# Patient Record
Sex: FEMALE | Race: WHITE | NOT HISPANIC OR LATINO | Employment: UNEMPLOYED | URBAN - METROPOLITAN AREA
[De-identification: names, ages, dates, MRNs, and addresses within clinical notes are randomized per-mention and may not be internally consistent; named-entity substitution may affect disease eponyms.]

---

## 2017-01-19 ENCOUNTER — HOSPITAL ENCOUNTER (OUTPATIENT)
Dept: OTHER | Facility: HOSPITAL | Age: 42
Setting detail: SPECIMEN
Discharge: HOME | End: 2017-01-19
Attending: INTERNAL MEDICINE | Admitting: INTERNAL MEDICINE

## 2018-09-27 ENCOUNTER — HOSPITAL ENCOUNTER (OUTPATIENT)
Dept: ONCOLOGY | Facility: CLINIC | Age: 43
Setting detail: INFUSION SERIES
Discharge: HOME | End: 2018-09-27
Attending: INTERNAL MEDICINE | Admitting: INTERNAL MEDICINE

## 2018-09-27 ENCOUNTER — CLINICAL SUPPORT (OUTPATIENT)
Dept: ONCOLOGY | Facility: HOSPITAL | Age: 43
End: 2018-09-27

## 2018-10-19 ENCOUNTER — HOSPITAL ENCOUNTER (OUTPATIENT)
Dept: MRI IMAGING | Facility: HOSPITAL | Age: 43
Discharge: HOME | End: 2018-10-19
Attending: INTERNAL MEDICINE | Admitting: INTERNAL MEDICINE

## 2018-10-25 ENCOUNTER — CLINICAL SUPPORT (OUTPATIENT)
Dept: ONCOLOGY | Facility: HOSPITAL | Age: 43
End: 2018-10-25

## 2018-10-25 ENCOUNTER — HOSPITAL ENCOUNTER (OUTPATIENT)
Dept: ONCOLOGY | Facility: CLINIC | Age: 43
Setting detail: INFUSION SERIES
Discharge: HOME | End: 2018-10-25
Attending: INTERNAL MEDICINE | Admitting: INTERNAL MEDICINE

## 2018-11-06 ENCOUNTER — HOSPITAL ENCOUNTER (OUTPATIENT)
Dept: LAB | Facility: HOSPITAL | Age: 43
Discharge: HOME | End: 2018-11-06
Attending: INTERNAL MEDICINE | Admitting: INTERNAL MEDICINE

## 2018-11-06 LAB
ALBUMIN SERPL-MCNC: 4.4 G/DL (ref 3.5–4.8)
ALBUMIN/GLOB SERPL: 1.3 {RATIO} (ref 1–1.7)
ALP SERPL-CCNC: 59 IU/L (ref 32–91)
ALT SERPL-CCNC: 25 IU/L (ref 14–54)
ANION GAP SERPL CALC-SCNC: 13.6 MMOL/L (ref 10–20)
AST SERPL-CCNC: 27 IU/L (ref 15–41)
BASOPHILS # BLD AUTO: 0.1 10*3/UL (ref 0–0.2)
BASOPHILS NFR BLD AUTO: 1 % (ref 0–2)
BILIRUB SERPL-MCNC: 0.4 MG/DL (ref 0.3–1.2)
BILIRUB UR QL STRIP: NEGATIVE MG/DL
BUN SERPL-MCNC: 8 MG/DL (ref 8–20)
BUN/CREAT SERPL: 10 (ref 5.4–26.2)
CALCIUM SERPL-MCNC: 9.5 MG/DL (ref 8.9–10.3)
CASTS URNS QL MICRO: NORMAL /[LPF]
CHLORIDE SERPL-SCNC: 104 MMOL/L (ref 101–111)
COLOR UR: YELLOW
CONV BACTERIA IN URINE MICRO: NEGATIVE
CONV CLARITY OF URINE: CLEAR
CONV CO2: 25 MMOL/L (ref 22–32)
CONV HYALINE CASTS IN URINE MICRO: 1 /[LPF] (ref 0–5)
CONV PROTEIN IN URINE BY AUTOMATED TEST STRIP: NEGATIVE MG/DL
CONV SMALL ROUND CELLS: NORMAL /[HPF]
CONV TOTAL PROTEIN: 7.7 G/DL (ref 6.1–7.9)
CONV UROBILINOGEN IN URINE BY AUTOMATED TEST STRIP: 0.2 MG/DL
CREAT UR-MCNC: 0.8 MG/DL (ref 0.4–1)
CRP SERPL-MCNC: 0.38 MG/DL (ref 0–0.7)
CULTURE INDICATED?: NORMAL
DIFFERENTIAL METHOD BLD: (no result)
EOSINOPHIL # BLD AUTO: 0.2 10*3/UL (ref 0–0.3)
EOSINOPHIL # BLD AUTO: 2 % (ref 0–3)
ERYTHROCYTE [DISTWIDTH] IN BLOOD BY AUTOMATED COUNT: 14.4 % (ref 11.5–14.5)
ERYTHROCYTE [SEDIMENTATION RATE] IN BLOOD BY WESTERGREN METHOD: 5 MM/HR (ref 0–20)
GLOBULIN UR ELPH-MCNC: 3.3 G/DL (ref 2.5–3.8)
GLUCOSE SERPL-MCNC: 98 MG/DL (ref 65–99)
GLUCOSE UR QL: NEGATIVE MG/DL
HCT VFR BLD AUTO: 46 % (ref 35–49)
HGB BLD-MCNC: 15.4 G/DL (ref 12–15)
HGB UR QL STRIP: NEGATIVE
KETONES UR QL STRIP: NEGATIVE MG/DL
LEUKOCYTE ESTERASE UR QL STRIP: NEGATIVE
LYMPHOCYTES # BLD AUTO: 2.8 10*3/UL (ref 0.8–4.8)
LYMPHOCYTES NFR BLD AUTO: 29 % (ref 18–42)
MCH RBC QN AUTO: 29.1 PG (ref 26–32)
MCHC RBC AUTO-ENTMCNC: 33.4 G/DL (ref 32–36)
MCV RBC AUTO: 87.3 FL (ref 80–94)
MONOCYTES # BLD AUTO: 0.6 10*3/UL (ref 0.1–1.3)
MONOCYTES NFR BLD AUTO: 6 % (ref 2–11)
NEUTROPHILS # BLD AUTO: 6.1 10*3/UL (ref 2.3–8.6)
NEUTROPHILS NFR BLD AUTO: 62 % (ref 50–75)
NITRITE UR QL STRIP: NEGATIVE
NRBC BLD AUTO-RTO: 0 /100{WBCS}
NRBC/RBC NFR BLD MANUAL: 0 10*3/UL
PH UR STRIP.AUTO: 5 [PH] (ref 4.5–8)
PLATELET # BLD AUTO: 321 10*3/UL (ref 150–450)
PMV BLD AUTO: 8.2 FL (ref 7.4–10.4)
POTASSIUM SERPL-SCNC: 3.6 MMOL/L (ref 3.6–5.1)
RBC # BLD AUTO: 5.28 10*6/UL (ref 4–5.4)
RBC #/AREA URNS HPF: 1 /[HPF] (ref 0–3)
SODIUM SERPL-SCNC: 139 MMOL/L (ref 136–144)
SP GR UR: 1.02 (ref 1–1.03)
SPERM URNS QL MICRO: NORMAL /[HPF]
SQUAMOUS SPT QL MICRO: 0 /[HPF] (ref 0–5)
UNIDENT CRYS URNS QL MICRO: NORMAL /[HPF]
WBC # BLD AUTO: 9.9 10*3/UL (ref 4.5–11.5)
WBC #/AREA URNS HPF: 1 /[HPF] (ref 0–5)
YEAST SPEC QL WET PREP: NORMAL /[HPF]

## 2018-11-06 PROCEDURE — 86481 TB AG RESPONSE T-CELL SUSP: CPT

## 2018-11-07 ENCOUNTER — LAB REQUISITION (OUTPATIENT)
Dept: LAB | Facility: HOSPITAL | Age: 43
End: 2018-11-07

## 2018-11-07 DIAGNOSIS — Z00.00 ROUTINE GENERAL MEDICAL EXAMINATION AT A HEALTH CARE FACILITY: ICD-10-CM

## 2018-11-07 LAB
CONV HIV-1/ HIV-2: NORMAL
CONV HIV-1/ HIV-2: NORMAL
HAV IGM SERPL QL IA: NONREACTIVE
HBV CORE IGM SERPL QL IA: NONREACTIVE
HBV SURFACE AG SERPL QL IA: NONREACTIVE
HCV AB SER DONR QL: NORMAL
HCV AB SER DONR QL: NORMAL

## 2018-11-08 LAB
TSPOT INTERPRETATION: NEGATIVE
TSPOT INTERPRETATION: NORMAL
TSPOT NIL CONTROL INTERPRETATION: NORMAL
TSPOT PANEL A: 0
TSPOT PANEL B: 0
TSPOT POS CONTROL INTERPRETATION: NORMAL

## 2018-12-04 ENCOUNTER — HOSPITAL ENCOUNTER (OUTPATIENT)
Dept: ONCOLOGY | Facility: CLINIC | Age: 43
Setting detail: INFUSION SERIES
Discharge: HOME | End: 2018-12-04
Attending: INTERNAL MEDICINE | Admitting: INTERNAL MEDICINE

## 2018-12-04 ENCOUNTER — CLINICAL SUPPORT (OUTPATIENT)
Dept: ONCOLOGY | Facility: HOSPITAL | Age: 43
End: 2018-12-04

## 2018-12-04 NOTE — PROGRESS NOTES
PATIENTS ONCOLOGY RECORD LOCATED IN [facility]      Subjective     Name:  WILLIAM MARTINEZ     Date:  2018  Address:  [address] [place] [facility]  Home: [unfilled]  :  1975 AGE:  43 y.o.        RECORDS OBTAINED:  Patients Oncology Record is located in [facility]

## 2018-12-05 ENCOUNTER — HOSPITAL ENCOUNTER (OUTPATIENT)
Dept: RHEUMATOLOGY | Facility: CLINIC | Age: 43
Discharge: HOME | End: 2018-12-05
Attending: INTERNAL MEDICINE | Admitting: INTERNAL MEDICINE

## 2019-01-24 ENCOUNTER — HOSPITAL ENCOUNTER (OUTPATIENT)
Dept: LAB | Facility: HOSPITAL | Age: 44
Discharge: HOME | End: 2019-01-24
Attending: INTERNAL MEDICINE | Admitting: INTERNAL MEDICINE

## 2019-01-24 LAB
ALBUMIN SERPL-MCNC: 4.1 G/DL (ref 3.5–4.8)
ALBUMIN/GLOB SERPL: 1.2 {RATIO} (ref 1–1.7)
ALP SERPL-CCNC: 67 IU/L (ref 32–91)
ALT SERPL-CCNC: 16 IU/L (ref 14–54)
ANION GAP SERPL CALC-SCNC: 14.2 MMOL/L (ref 10–20)
AST SERPL-CCNC: 17 IU/L (ref 15–41)
BASOPHILS # BLD AUTO: 0 10*3/UL (ref 0–0.2)
BASOPHILS NFR BLD AUTO: 0 % (ref 0–2)
BILIRUB SERPL-MCNC: 0.5 MG/DL (ref 0.3–1.2)
BILIRUB UR QL STRIP: NEGATIVE MG/DL
BUN SERPL-MCNC: 6 MG/DL (ref 8–20)
BUN/CREAT SERPL: 6.7 (ref 5.4–26.2)
CALCIUM SERPL-MCNC: 9.6 MG/DL (ref 8.9–10.3)
CASTS URNS QL MICRO: NORMAL /[LPF]
CHLORIDE SERPL-SCNC: 100 MMOL/L (ref 101–111)
COLOR UR: YELLOW
CONV BACTERIA IN URINE MICRO: NEGATIVE
CONV CLARITY OF URINE: CLEAR
CONV CO2: 26 MMOL/L (ref 22–32)
CONV HYALINE CASTS IN URINE MICRO: 0 /[LPF] (ref 0–5)
CONV PROTEIN IN URINE BY AUTOMATED TEST STRIP: NEGATIVE MG/DL
CONV SMALL ROUND CELLS: NORMAL /[HPF]
CONV TOTAL PROTEIN: 7.4 G/DL (ref 6.1–7.9)
CONV UROBILINOGEN IN URINE BY AUTOMATED TEST STRIP: 0.2 MG/DL
CREAT UR-MCNC: 0.9 MG/DL (ref 0.4–1)
CRP SERPL-MCNC: 1.43 MG/DL (ref 0–0.7)
CULTURE INDICATED?: NORMAL
DIFFERENTIAL METHOD BLD: (no result)
EOSINOPHIL # BLD AUTO: 0.2 10*3/UL (ref 0–0.3)
EOSINOPHIL # BLD AUTO: 2 % (ref 0–3)
ERYTHROCYTE [DISTWIDTH] IN BLOOD BY AUTOMATED COUNT: 14.4 % (ref 11.5–14.5)
ERYTHROCYTE [SEDIMENTATION RATE] IN BLOOD BY WESTERGREN METHOD: 12 MM/HR (ref 0–20)
GLOBULIN UR ELPH-MCNC: 3.3 G/DL (ref 2.5–3.8)
GLUCOSE SERPL-MCNC: 97 MG/DL (ref 65–99)
GLUCOSE UR QL: NEGATIVE MG/DL
HCT VFR BLD AUTO: 45.8 % (ref 35–49)
HGB BLD-MCNC: 15.5 G/DL (ref 12–15)
HGB UR QL STRIP: NEGATIVE
KETONES UR QL STRIP: NEGATIVE MG/DL
LEUKOCYTE ESTERASE UR QL STRIP: NEGATIVE
LYMPHOCYTES # BLD AUTO: 2.4 10*3/UL (ref 0.8–4.8)
LYMPHOCYTES NFR BLD AUTO: 26 % (ref 18–42)
MCH RBC QN AUTO: 29.5 PG (ref 26–32)
MCHC RBC AUTO-ENTMCNC: 33.8 G/DL (ref 32–36)
MCV RBC AUTO: 87.2 FL (ref 80–94)
MONOCYTES # BLD AUTO: 0.6 10*3/UL (ref 0.1–1.3)
MONOCYTES NFR BLD AUTO: 7 % (ref 2–11)
NEUTROPHILS # BLD AUTO: 6 10*3/UL (ref 2.3–8.6)
NEUTROPHILS NFR BLD AUTO: 65 % (ref 50–75)
NITRITE UR QL STRIP: NEGATIVE
NRBC BLD AUTO-RTO: 0 /100{WBCS}
NRBC/RBC NFR BLD MANUAL: 0 10*3/UL
PH UR STRIP.AUTO: 6.5 [PH] (ref 4.5–8)
PLATELET # BLD AUTO: 346 10*3/UL (ref 150–450)
PMV BLD AUTO: 7.8 FL (ref 7.4–10.4)
POTASSIUM SERPL-SCNC: 4.2 MMOL/L (ref 3.6–5.1)
RBC # BLD AUTO: 5.25 10*6/UL (ref 4–5.4)
RBC #/AREA URNS HPF: 1 /[HPF] (ref 0–3)
SODIUM SERPL-SCNC: 136 MMOL/L (ref 136–144)
SP GR UR: 1.01 (ref 1–1.03)
SPERM URNS QL MICRO: NORMAL /[HPF]
SQUAMOUS SPT QL MICRO: 1 /[HPF] (ref 0–5)
UNIDENT CRYS URNS QL MICRO: NORMAL /[HPF]
WBC # BLD AUTO: 9.2 10*3/UL (ref 4.5–11.5)
WBC #/AREA URNS HPF: 0 /[HPF] (ref 0–5)
YEAST SPEC QL WET PREP: NORMAL /[HPF]

## 2019-05-29 ENCOUNTER — CONVERSION ENCOUNTER (OUTPATIENT)
Dept: RHEUMATOLOGY | Facility: CLINIC | Age: 44
End: 2019-05-29

## 2019-05-29 ENCOUNTER — HOSPITAL ENCOUNTER (OUTPATIENT)
Dept: LAB | Facility: HOSPITAL | Age: 44
Discharge: HOME | End: 2019-05-29
Attending: INTERNAL MEDICINE | Admitting: INTERNAL MEDICINE

## 2019-05-29 LAB
ALBUMIN SERPL-MCNC: 4.2 G/DL (ref 3.5–4.8)
ALBUMIN/GLOB SERPL: 1.4 {RATIO} (ref 1–1.7)
ALP SERPL-CCNC: 69 IU/L (ref 32–91)
ALT SERPL-CCNC: 18 IU/L (ref 14–54)
ANION GAP SERPL CALC-SCNC: 16.5 MMOL/L (ref 10–20)
AST SERPL-CCNC: 19 IU/L (ref 15–41)
BASOPHILS # BLD AUTO: 0 10*3/UL (ref 0–0.2)
BASOPHILS NFR BLD AUTO: 1 % (ref 0–2)
BILIRUB SERPL-MCNC: 0.6 MG/DL (ref 0.3–1.2)
BILIRUB UR QL STRIP: NEGATIVE MG/DL
BUN SERPL-MCNC: 6 MG/DL (ref 8–20)
BUN/CREAT SERPL: 7.5 (ref 5.4–26.2)
CALCIUM SERPL-MCNC: 9.6 MG/DL (ref 8.9–10.3)
CASTS URNS QL MICRO: NORMAL /[LPF]
CHLORIDE SERPL-SCNC: 105 MMOL/L (ref 101–111)
COLOR UR: YELLOW
CONV BACTERIA IN URINE MICRO: NEGATIVE
CONV CLARITY OF URINE: CLEAR
CONV CO2: 25 MMOL/L (ref 22–32)
CONV HYALINE CASTS IN URINE MICRO: 1 /[LPF] (ref 0–5)
CONV PROTEIN IN URINE BY AUTOMATED TEST STRIP: NEGATIVE MG/DL
CONV SMALL ROUND CELLS: NORMAL /[HPF]
CONV TOTAL PROTEIN: 7.3 G/DL (ref 6.1–7.9)
CONV UROBILINOGEN IN URINE BY AUTOMATED TEST STRIP: 0.2 MG/DL
CREAT UR-MCNC: 0.8 MG/DL (ref 0.4–1)
CRP SERPL-MCNC: 0.5 MG/DL (ref 0–0.7)
CULTURE INDICATED?: NORMAL
DIFFERENTIAL METHOD BLD: (no result)
EOSINOPHIL # BLD AUTO: 0.1 10*3/UL (ref 0–0.3)
EOSINOPHIL # BLD AUTO: 1 % (ref 0–3)
ERYTHROCYTE [DISTWIDTH] IN BLOOD BY AUTOMATED COUNT: 14.5 % (ref 11.5–14.5)
ERYTHROCYTE [SEDIMENTATION RATE] IN BLOOD BY WESTERGREN METHOD: 12 MM/HR (ref 0–20)
GLOBULIN UR ELPH-MCNC: 3.1 G/DL (ref 2.5–3.8)
GLUCOSE SERPL-MCNC: 96 MG/DL (ref 65–99)
GLUCOSE UR QL: NEGATIVE MG/DL
HCT VFR BLD AUTO: 45.2 % (ref 35–49)
HGB BLD-MCNC: 15 G/DL (ref 12–15)
HGB UR QL STRIP: NEGATIVE
KETONES UR QL STRIP: NEGATIVE MG/DL
LEUKOCYTE ESTERASE UR QL STRIP: NEGATIVE
LYMPHOCYTES # BLD AUTO: 2.4 10*3/UL (ref 0.8–4.8)
LYMPHOCYTES NFR BLD AUTO: 27 % (ref 18–42)
MCH RBC QN AUTO: 29.3 PG (ref 26–32)
MCHC RBC AUTO-ENTMCNC: 33.1 G/DL (ref 32–36)
MCV RBC AUTO: 88.4 FL (ref 80–94)
MONOCYTES # BLD AUTO: 0.3 10*3/UL (ref 0.1–1.3)
MONOCYTES NFR BLD AUTO: 3 % (ref 2–11)
NEUTROPHILS # BLD AUTO: 6.3 10*3/UL (ref 2.3–8.6)
NEUTROPHILS NFR BLD AUTO: 68 % (ref 50–75)
NITRITE UR QL STRIP: NEGATIVE
NRBC BLD AUTO-RTO: 0 /100{WBCS}
NRBC/RBC NFR BLD MANUAL: 0 10*3/UL
PH UR STRIP.AUTO: 6.5 [PH] (ref 4.5–8)
PLATELET # BLD AUTO: 320 10*3/UL (ref 150–450)
PMV BLD AUTO: 8.1 FL (ref 7.4–10.4)
POTASSIUM SERPL-SCNC: 4.5 MMOL/L (ref 3.6–5.1)
RBC # BLD AUTO: 5.11 10*6/UL (ref 4–5.4)
RBC #/AREA URNS HPF: 1 /[HPF] (ref 0–3)
SODIUM SERPL-SCNC: 142 MMOL/L (ref 136–144)
SP GR UR: 1.02 (ref 1–1.03)
SPERM URNS QL MICRO: NORMAL /[HPF]
SQUAMOUS SPT QL MICRO: 1 /[HPF] (ref 0–5)
UNIDENT CRYS URNS QL MICRO: NORMAL /[HPF]
WBC # BLD AUTO: 9.2 10*3/UL (ref 4.5–11.5)
WBC #/AREA URNS HPF: 0 /[HPF] (ref 0–5)
YEAST SPEC QL WET PREP: NORMAL /[HPF]

## 2019-06-04 VITALS
SYSTOLIC BLOOD PRESSURE: 116 MMHG | HEART RATE: 80 BPM | DIASTOLIC BLOOD PRESSURE: 78 MMHG | BODY MASS INDEX: 26.68 KG/M2 | WEIGHT: 166 LBS | HEIGHT: 66 IN

## 2019-06-06 NOTE — PROGRESS NOTES
Visit Type:  Follow-up Visit    CC:  joint pain.    History of Present Illness:    The patient is a 44-year-old female with seronegative inflammatory arthritis coming today in follow-up.  Her current treatment consists of MTX 15 mg once a week and folic acid.  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 and had good   recovery but it was slow, she reports a lot of swelling in her Rt.  Upper extremity and persistent pain.  She has resumed her treatment with MTX about 2 weeks ago.    Today she reports pain that is constant, achy, rated 6/10, stiffness that varies.  Her worse joints are her wrists, her fingers her ankles and her feet.  Her symptoms worsens with activity.  She has not identify anything that helps with her pain yet.    Review of systems is (-) for fever, she has dry eyes and dry mouth, she has occasional oral ulcers.  She has nausea with her MTX.  She has occasional CP, denies cough.  No dysuria, no macroscopic hematuria.    She continues to have migraine headaches  On   an off.All other systems reviewed and were (-).      Rheumatologic history:  1.Seronegative  inflammatory arthritis diagnosed 12/2018    Vectra DA 38     MTX (12/18) Start 12.5mg/wk for 2wks & then increase to 15mg/wk--SubQ due to GI distress    2. osteoporosis of the scaphoid lunate bilaterally  Status post surgery February 2019 by could send my nurse      Past Medical History:     Reviewed history from 11/06/2018 and no changes required:        Arthritis        Breast Lump        Hernia        Migraine Headaches    Past Surgical History:     Reviewed history from 11/06/2018 and no changes required:        Hysterectomy    Family History Summary:      Reviewed history Last on 02/05/2019 and no changes required:05/30/2019  MGM - Has Family History of Stroke/CVA - Entered On: 11/6/2018  MGM - Has Family History of Kidney/Renal Disease - Entered On: 11/6/2018  MGM - Has Family History of Diabetes - Entered On:  11/6/2018  MGF - Has Family History of Severe Allergies - Entered On: 11/6/2018  MGF - Has Family History of Hypertension - Entered On: 11/6/2018      Social History:     Reviewed history from 12/05/2018 and no changes required:        Patient currently smokes every day.        Patient has been counseled to quit.        Passive Smoke: Y        Alcohol Use: N        Drug Use: N        HIV/High Risk: N        Regular Exercise: N        Hx Domestic Abuse: N        Zoroastrianism Affecting Care: N                Alcohol Use: N        Risk Factors:     Smoked Tobacco Use:  Current every day smoker     Cigarettes:  Yes -- 1 pack(s) per day,Smokeless Tobacco Use:  Never     Counseled to quit/cut down:  yes  Passive smoke exposure:  yes  Drug use:  no  HIV high-risk behavior:  no  Alcohol use:  no  Exercise:  no  Seatbelt use:  100 %  Sun Exposure:  rarely    Previous Tobacco Use: Signed On - 02/05/2019  Smoked Tobacco Use:  Current every day smoker     Cigarettes:  Yes -- 1 pack(s) per day,Smokeless Tobacco Use:  Never     Counseled to quit/cut down:  yes  Passive smoke exposure:  yes  Drug use:  no  HIV high-risk behavior:  no    Previous Alcohol Use: Signed On - 02/05/2019  Alcohol use:  no  Exercise:  no  Seatbelt use:  100 %  Sun Exposure:  rarely    Active Medications (reviewed today):  PREVNAR 13 INTRAMUSCULAR SUSPENSION (PNEUMOCOCCAL 13-SERA CONJ VACC) Inject 1 sub q at pharmacy  MUCINEX DM  MG ORAL TABLET EXTENDED RELEASE 12 HOUR (DEXTROMETHORPHAN-GUAIFENESIN) Take 1 tab po qd with MTX and 1 tab po BID the day after MTX  FOLIC ACID 1 MG ORAL TABLET (FOLIC ACID) Take 1 tablet by mouth daily  METHOTREXATE 2.5 MG ORAL TABLET (METHOTREXATE SODIUM) Take 5 tabs po q weekly for 2 weeks then increase to 6 tabs po q weekly (divided dose 3 in the am and then 3 in the pm)  DICLOFENAC SODIUM 1 % TRANSDERMAL GEL (DICLOFENAC SODIUM) 4 times daily as needed  CYANOCOBALAMIN 1000 MCG/ML INJECTION SOLUTION (CYANOCOBALAMIN) 1ml inj  every 2 weeks  VITAMIN D3 88748 UNIT ORAL TABLET (CHOLECALCIFEROL) take one tablet by mouth once weekly    Current Allergies (reviewed today):  CIPRO (Mild)      Review of Systems        See HPI      Vital Signs:    Patient Profile:    44 Years Old Female  Height:     65.5 inches (166.37 cm)  Weight:     166 pounds  BMI:        27.20     BSA:        1.84  Pulse rate: 80 / minute  BP Sittin / 78  (right arm)    Cuff size:  regular      Problems: Active problems were reviewed with the patient during this visit.  Medications: Medications were reviewed with the patient during this visit.  Allergies: Allergies were reviewed with the patient during this visit.        Vitals Entered By: Anika Pinto MA (May 29, 2019 10:50 AM)      Physical Exam    General:      well developed, well nourished, in no acute distress.    Head:      normocephalic and atraumatic.    Eyes:      PERRL/EOM intact, conjunctiva and sclera clear with out nystagmus.    Lungs:      clear bilaterally to auscultation.    Heart:      non-displaced PMI, chest non-tender; regular rate and rhythm, S1, S2 without murmurs, rubs, or gallops  Msk:       tenderness in both wrists  Rt.>Lt.  There is tenderness in 6 MCP joints, 4 PIP joints.  There is mild synovitis noted in 1 PIP joint.  Pulses:      pulses normal in all 4 extremities.    Skin:      intact without lesions or rashes.      Diabetes Management Exam:      Foot Exam (with socks and/or shoes not present):        Pulses:           pulses normal in all 4 extremities.        Blood Pressure:  Today's BP: 116/78 mm Hg            Impression & Recommendations:    Problem # 1:  Arthritis, rheumatoid, seronegative (ICD-714.0) (KJC31-U78.00)  Assessment: Unchanged    44-year-old female with seronegative inflammatory arthritis currently on MTX 15 mg p.o..  She reports GI distress and headaches with the medication but mostly GI distress. Today she has 12 tender joints, synovitis in 1 joint. Pt. Ga 70.   Plan to  "stop   p.o. medication and transition her to subcutaneous form of MTX.  Will I will add on  on the sulfasalazine.          Cancer screening:  Colonoscopy:    YES  2017                 Mammogram:   8/2018               Pap Smear: Hysterectomy  Bone health: Calcium and Vitamin D:    YES           DXA scan: NO  Vaccines: Flu:     NO                PNA: 13 1/2019 23 2/2019                Zoster:  NO  X-rays: Chest:  12/5/18           Hands:  11/9/18 and [facility] U/S              Feet: 12/5/18  Hepatitis panel, HIV, QTB/PPD:   11/6/18 HEP, HIV and TB      Problem # 2:  Long-term use of high risk medications (ICD-V58.69) (SIU36-X50.899)  Assessment: Unchanged    Long-term high-risk medications.  On MTX and sulfasalazine for management of inflammatory arthritis, monitor for side effects. will transition to subcutaneous MTX due to to reported GI distress.    Problem # 3:  Osteonecrosis (ICD-733.40) (GRC33-U45.9)  Continues to complain of pain at her surgical intervention but overall doing better.  She is planning to proceed with her next surgery in a year or so.  Her updated medication list for this problem includes:     Vitamin D3 63943 Unit Oral Tablet (Cholecalciferol) ..... Take one tablet by mouth once weekly      Problem # 4:  Migraine (ICD-346.90) (ECF42-H51.909)  Assessment: Comment Only    I will monitor to see if there is any worsening migraines after she is transitioned to the subcutaneous form of MTX.  She refers that she has not required to take Mucinex recently.    Medications Added to Medication List This Visit:  1)  Sulfasalazine 500 Mg Oral Tablet (Sulfasalazine) .... Take one (1) tablet by mouth twice a day  2)  Bd Tb Syringe 25g X 5/8\" 1 Ml (Tuberculin-allergy syringes) .... Use as directed  3)  Folic Acid 1 Mg Oral Tablet (Folic acid) .... Take 1 tablet by mouth daily  4)  Methotrexate Sodium 250 Mg/10ml Injection Solution (Methotrexate sodium) .... Inject 15mg (0.6cc) sq once weekly    Other Orders:  [facility] CBC " W/DIFF; PATH REVIEW IF INDICATED (CBC)  [facility] COMPREHENSIVE METABOLIC PANEL (CMP) (MPC)  [facility] SEDIMENTATION RATE (ESR)  [facility] CRP C-REACTIVE PROTEIN INFLAMMATION (CRP)  [facility] URINALYSIS W/MICROSCOPIC (UAM)  VECTRA DA (CPT-06573)      Patient Instructions:  1)  Stop  P.o. MTX  2)  Start subcutaneous MTX 15 mg once a week  3)  Continue with folic acid 1 mg p.o. daily  4)  Start sulfasalazine 500 mg 1 tablet p.o. twice a day  5)  Compounded cream to be applied twice daily in affected joints  6)  RTC in 3 months or sooner if need                      Medication Administration    Orders Added:  1)  [facility] CBC W/DIFF; PATH REVIEW IF INDICATED [CBC]  2)  [facility] COMPREHENSIVE METABOLIC PANEL (CMP) [MPC]  3)  [facility] SEDIMENTATION RATE [ESR]  4)  [facility] CRP C-REACTIVE PROTEIN INFLAMMATION [CRP]  5)  [facility] URINALYSIS W/MICROSCOPIC [UAM]  6)  VECTRA DA [CPT-11215]  7)  Ofc Vst, Est Level IV [93422]  ]      Electronically signed by Aimee Ramirez MD on 05/30/2019 at 5:01 PM  ________________________________________________________________________       Disclaimer: Converted Note message may not contain all data elements that existed in the legacy source system. Please see [employer] System for the original note details.

## 2019-06-20 ENCOUNTER — TELEPHONE (OUTPATIENT)
Dept: RHEUMATOLOGY | Facility: CLINIC | Age: 44
End: 2019-06-20

## 2019-06-20 NOTE — TELEPHONE ENCOUNTER
Phone Note   Call from Patient  Summary of Call: Patient calling stating that she is having CHEST tightness and she feels it's from the SSZ and it's making her nauseated.     Follow-up for Phone Call   Follow-up Details: I stated for her to D/C this medication for 1 week and to call the office back if her symptoms subside and then I will get with the doctor to see if it needs to be replaced with something else.  Follow-up by: Nancy Clemens CMA,  June 14, 2019 1:20 PM    Additional Follow-up for Phone Call   Additional Follow-up Details: Yes, I agree. Pt definitely needs to stop medication & let us know how she feels after stopping this.   Additional Follow-up by: Destiny CAR,  June 14, 2019 9:26 PM

## 2019-07-30 RX ORDER — METHOTREXATE 25 MG/ML
INJECTION, SOLUTION INTRA-ARTERIAL; INTRAMUSCULAR; INTRAVENOUS
Qty: 8 ML | Refills: 0 | Status: SHIPPED | OUTPATIENT
Start: 2019-07-30 | End: 2019-09-17

## 2019-09-17 ENCOUNTER — TRANSCRIBE ORDERS (OUTPATIENT)
Dept: LAB | Facility: HOSPITAL | Age: 44
End: 2019-09-17

## 2019-09-17 DIAGNOSIS — Z00.00 ROUTINE GENERAL MEDICAL EXAMINATION AT A HEALTH CARE FACILITY: Primary | ICD-10-CM

## 2019-09-17 PROCEDURE — 81003 URINALYSIS AUTO W/O SCOPE: CPT

## 2019-09-17 RX ORDER — METHOTREXATE 25 MG/ML
INJECTION INTRA-ARTERIAL; INTRAMUSCULAR; INTRATHECAL; INTRAVENOUS
Qty: 10 ML | Refills: 0 | Status: SHIPPED | OUTPATIENT
Start: 2019-09-17

## 2019-09-18 ENCOUNTER — LAB (OUTPATIENT)
Dept: LAB | Facility: HOSPITAL | Age: 44
End: 2019-09-18

## 2019-09-18 DIAGNOSIS — Z00.00 ROUTINE GENERAL MEDICAL EXAMINATION AT A HEALTH CARE FACILITY: ICD-10-CM

## 2019-09-18 LAB
ALBUMIN SERPL-MCNC: 4.1 G/DL (ref 3.5–4.8)
ALBUMIN/GLOB SERPL: 1.4 G/DL (ref 1–1.7)
ALP SERPL-CCNC: 68 U/L (ref 32–91)
ALT SERPL W P-5'-P-CCNC: 45 U/L (ref 14–54)
ANION GAP SERPL CALCULATED.3IONS-SCNC: 12.1 MMOL/L (ref 5–15)
AST SERPL-CCNC: 29 U/L (ref 15–41)
BASOPHILS # BLD AUTO: 0 10*3/MM3 (ref 0–0.2)
BASOPHILS NFR BLD AUTO: 0.7 % (ref 0–1.5)
BILIRUB SERPL-MCNC: 0.4 MG/DL (ref 0.3–1.2)
BILIRUB UR QL STRIP: NEGATIVE
BUN BLD-MCNC: 11 MG/DL (ref 8–20)
BUN/CREAT SERPL: 12.2 (ref 5.4–26.2)
CALCIUM SPEC-SCNC: 9.2 MG/DL (ref 8.9–10.3)
CHLORIDE SERPL-SCNC: 106 MMOL/L (ref 101–111)
CLARITY UR: CLEAR
CO2 SERPL-SCNC: 26 MMOL/L (ref 22–32)
COLOR UR: YELLOW
CREAT BLD-MCNC: 0.9 MG/DL (ref 0.4–1)
CRP SERPL-MCNC: 0.33 MG/DL (ref 0–0.7)
DEPRECATED RDW RBC AUTO: 49.4 FL (ref 37–54)
EOSINOPHIL # BLD AUTO: 0.2 10*3/MM3 (ref 0–0.4)
EOSINOPHIL NFR BLD AUTO: 2.2 % (ref 0.3–6.2)
ERYTHROCYTE [DISTWIDTH] IN BLOOD BY AUTOMATED COUNT: 15.5 % (ref 12.3–15.4)
ERYTHROCYTE [SEDIMENTATION RATE] IN BLOOD: 9 MM/HR (ref 0–20)
GFR SERPL CREATININE-BSD FRML MDRD: 68 ML/MIN/1.73
GLOBULIN UR ELPH-MCNC: 3 GM/DL (ref 2.5–3.8)
GLUCOSE BLD-MCNC: 89 MG/DL (ref 65–99)
GLUCOSE UR STRIP-MCNC: NEGATIVE MG/DL
HCT VFR BLD AUTO: 43.1 % (ref 34–46.6)
HGB BLD-MCNC: 14.7 G/DL (ref 12–15.9)
HGB UR QL STRIP.AUTO: NEGATIVE
KETONES UR QL STRIP: NEGATIVE
LEUKOCYTE ESTERASE UR QL STRIP.AUTO: NEGATIVE
LYMPHOCYTES # BLD AUTO: 2.3 10*3/MM3 (ref 0.7–3.1)
LYMPHOCYTES NFR BLD AUTO: 30.4 % (ref 19.6–45.3)
MCH RBC QN AUTO: 31.7 PG (ref 26.6–33)
MCHC RBC AUTO-ENTMCNC: 34.1 G/DL (ref 31.5–35.7)
MCV RBC AUTO: 92.7 FL (ref 79–97)
MONOCYTES # BLD AUTO: 0.6 10*3/MM3 (ref 0.1–0.9)
MONOCYTES NFR BLD AUTO: 7.8 % (ref 5–12)
NEUTROPHILS # BLD AUTO: 4.4 10*3/MM3 (ref 1.7–7)
NEUTROPHILS NFR BLD AUTO: 58.9 % (ref 42.7–76)
NITRITE UR QL STRIP: NEGATIVE
NRBC BLD AUTO-RTO: 0 /100 WBC (ref 0–0.2)
PH UR STRIP.AUTO: 6.5 [PH] (ref 5–8)
PLATELET # BLD AUTO: 287 10*3/MM3 (ref 140–450)
PMV BLD AUTO: 7.8 FL (ref 6–12)
POTASSIUM BLD-SCNC: 4.1 MMOL/L (ref 3.6–5.1)
PROT SERPL-MCNC: 7.1 G/DL (ref 6.1–7.9)
PROT UR QL STRIP: NEGATIVE
RBC # BLD AUTO: 4.64 10*6/MM3 (ref 3.77–5.28)
SODIUM BLD-SCNC: 140 MMOL/L (ref 136–144)
SP GR UR STRIP: 1.02 (ref 1–1.03)
UROBILINOGEN UR QL STRIP: NORMAL
WBC NRBC COR # BLD: 7.5 10*3/MM3 (ref 3.4–10.8)

## 2019-09-18 PROCEDURE — 80053 COMPREHEN METABOLIC PANEL: CPT

## 2019-09-18 PROCEDURE — 36415 COLL VENOUS BLD VENIPUNCTURE: CPT

## 2019-09-18 PROCEDURE — 81003 URINALYSIS AUTO W/O SCOPE: CPT

## 2019-09-18 PROCEDURE — 86140 C-REACTIVE PROTEIN: CPT

## 2019-09-18 PROCEDURE — 85025 COMPLETE CBC W/AUTO DIFF WBC: CPT

## 2019-09-18 PROCEDURE — 85652 RBC SED RATE AUTOMATED: CPT

## 2019-09-19 ENCOUNTER — LAB REQUISITION (OUTPATIENT)
Dept: LAB | Facility: HOSPITAL | Age: 44
End: 2019-09-19

## 2019-09-19 DIAGNOSIS — M06.09 RHEUMATOID ARTHRITIS OF MULTIPLE SITES WITHOUT RHEUMATOID FACTOR (HCC): ICD-10-CM

## 2019-09-24 ENCOUNTER — OFFICE VISIT (OUTPATIENT)
Dept: RHEUMATOLOGY | Facility: CLINIC | Age: 44
End: 2019-09-24

## 2019-09-24 ENCOUNTER — TELEPHONE (OUTPATIENT)
Dept: RHEUMATOLOGY | Facility: CLINIC | Age: 44
End: 2019-09-24

## 2019-09-24 VITALS
WEIGHT: 163 LBS | HEART RATE: 73 BPM | SYSTOLIC BLOOD PRESSURE: 112 MMHG | BODY MASS INDEX: 27.16 KG/M2 | DIASTOLIC BLOOD PRESSURE: 80 MMHG | HEIGHT: 65 IN

## 2019-09-24 DIAGNOSIS — K21.9 GASTROESOPHAGEAL REFLUX DISEASE, ESOPHAGITIS PRESENCE NOT SPECIFIED: ICD-10-CM

## 2019-09-24 DIAGNOSIS — M19.90 INFLAMMATORY ARTHRITIS: Primary | ICD-10-CM

## 2019-09-24 DIAGNOSIS — Z79.899 LONG-TERM USE OF HIGH-RISK MEDICATION: ICD-10-CM

## 2019-09-24 DIAGNOSIS — K21.9 GASTROESOPHAGEAL REFLUX DISEASE, ESOPHAGITIS PRESENCE NOT SPECIFIED: Primary | ICD-10-CM

## 2019-09-24 PROCEDURE — 99214 OFFICE O/P EST MOD 30 MIN: CPT | Performed by: INTERNAL MEDICINE

## 2019-09-24 RX ORDER — RANITIDINE 150 MG/1
150 TABLET ORAL 2 TIMES DAILY
Qty: 60 TABLET | Refills: 2 | Status: SHIPPED | OUTPATIENT
Start: 2019-09-24 | End: 2019-12-14

## 2019-09-24 RX ORDER — ERGOCALCIFEROL 1.25 MG/1
CAPSULE ORAL
Refills: 8 | COMMUNITY
Start: 2019-09-17

## 2019-09-24 RX ORDER — FOLIC ACID 1 MG/1
1000 TABLET ORAL DAILY
Refills: 3 | COMMUNITY
Start: 2019-09-17 | End: 2020-01-08

## 2019-09-24 RX ORDER — SULFASALAZINE 500 MG/1
TABLET ORAL EVERY 12 HOURS
COMMUNITY
Start: 2018-12-05

## 2019-09-24 RX ORDER — CYANOCOBALAMIN 1000 UG/ML
INJECTION, SOLUTION INTRAMUSCULAR; SUBCUTANEOUS
Refills: 8 | COMMUNITY
Start: 2019-09-17

## 2019-09-24 RX ORDER — GUAIFENESIN AND DEXTROMETHORPHAN HYDROBROMIDE 600; 30 MG/1; MG/1
TABLET, EXTENDED RELEASE ORAL
COMMUNITY
Start: 2018-12-05

## 2019-09-24 NOTE — PROGRESS NOTES
The patient is a pleasant 44-year-old female who comes today in follow-up for management of seronegative inflammatory arthritis.  She was less than seen in the office May 29, 2019.  At that time, I changed methotrexate from p.o. to subcutaneous methotrexate due to reported nausea and GI distress.  She was also instructed to start sulfasalazine about a week after she made those changes, she started the medication at the same time and has noticed since then persistent nausea, unchanged.    Continues to complain of polyarticular joint pain that today is rated 5 out of 10, she has several hours of morning stiffness, her hands, her wrist and her left knee are the most tender joints.  A couple of weeks ago, she noticed numbness in her left third digit, it lasted for about 4 days and then he went back to normal without any intervention.    Review of systems is positive for chills, she is on antibiotic, she has dry eyes and dry mouth, uses conservative measures with some success, denies oral nasal ulcers, denies shortness of breath no cough, she has occasional chest pain, she has chronic urinary frequency.  She has headaches regularly.  All other systems reviewed and they were negative.    Family history reviewed and unchanged.    Active Ambulatory Problems     Diagnosis Date Noted   • No Active Ambulatory Problems     Resolved Ambulatory Problems     Diagnosis Date Noted   • No Resolved Ambulatory Problems     Past Medical History:   Diagnosis Date   • Arthritis    • Breast lump    • Migraine headache        Physical examination:  Vitals:    09/24/19 1135   BP: 112/80   Pulse: 73     GENERAL: Well-developed, well-nourished in no acute distress. Alert and oriented x3.  HEENT: Normocephalic, atraumatic. Pupils are equal, round, and reactive to light. Extraocular muscles are intact. Mucous membranes are pink and moist. Nostrils are clear.   NECK: Supple without lymphadenopathy.  LUNGS: Clear to auscultation  bilaterally.  HEART: Regular rate and rhythm without murmur, rub or gallop.  CHEST: Respirations easy and unlabored.  EXTREMITIES: No cyanosis, edema or clubbing.  SKIN: Warm, dry and intact.  MSK: There is tenderness in both wrists, 8 MCP joints, and 8 PIP joints.  There is synovitis noted in 3 PIP joints.  Left knee with crepitus preserved range of motion, ballottement and bulge sign negative.    Assessment:  1.  Inflammatory arthritis, seronegative.  Unchanged, she continues to have active disease.  She has tenderness in 18 joints, synovitis noted in 3 PIP joints.  Clinically active disease.  She has experienced nausea, she introduced sulfasalazine at the same time that I made to transition from p.o. to subcutaneous methotrexate and I believe that he will be warranted to stop sulfasalazine to see if this is the culprit of her GI distress.  I will continue with methotrexate subcu.  Will add Humira.    Laboratory show CRP 0.33, creatinine 0.9, calcium 9.2, liver function test normal.  ESR 9, WBC count 7.5, hemoglobin 14.7, platelet count 287, UA is negative.  Vectra  43      2 long-term high-risk medications, the patient is on medications for management of inflammatory arthritis.  I am monitoring for side effects.    Cancer screening:  Colonoscopy:    YES  2017                 Mammogram: 2019        Pap Smear: Hysterectomy  Bone health: Calcium and Vitamin D:    YES           DXA scan: NO  Vaccines: Flu: no     PNA: 13 1/2019 23 2/2019                Zoster:  NO  X-rays: Chest:  12/5/18           Hands:  11/9/18 and [facility] U/S              Feet: 12/5/18  Hepatitis panel, HIV, QTB/PPD:   11/6/18 HEP, HIV and TB    #3 osteonecrosis.  Unchanged, follow-up with Ortho.    4.  Nausea.  Deteriorated. Possibly secondary to sulfasalazine.  Plan as above.    Plan:  Stop sulfasalazine  We will stop the MTX vials, will transition her to the self injectable 15 mg subcu once a week  I will start her on Humira 40 mg subcu every  other week  Take Zantac 150 mg 1 tablet p.o. twice daily  RTC in 3 months or sooner if needed.

## 2019-10-23 ENCOUNTER — CLINICAL SUPPORT (OUTPATIENT)
Dept: RHEUMATOLOGY | Facility: CLINIC | Age: 44
End: 2019-10-23

## 2019-10-23 DIAGNOSIS — M19.90 INFLAMMATORY ARTHRITIS: Primary | ICD-10-CM

## 2019-10-23 NOTE — PROGRESS NOTES
Charu came in the office to today for enbrel injection training. Charu was given a demonstration on how to correctly give herself enbrel. At the end Charu voiced an understanding. Enbrel sample injection was administered in the abdomen and tolerated well. Pt was provided with 2 samples  Lot [id number]  Exp 3/21

## 2019-11-04 DIAGNOSIS — M19.90 INFLAMMATORY ARTHRITIS: ICD-10-CM

## 2019-11-12 DIAGNOSIS — M19.90 INFLAMMATORY ARTHRITIS: Primary | ICD-10-CM

## 2019-11-12 DIAGNOSIS — K21.9 GASTROESOPHAGEAL REFLUX DISEASE, ESOPHAGITIS PRESENCE NOT SPECIFIED: ICD-10-CM

## 2019-11-14 DIAGNOSIS — M19.90 INFLAMMATORY ARTHRITIS: ICD-10-CM

## 2019-11-22 RX ORDER — PANTOPRAZOLE SODIUM 20 MG/1
TABLET, DELAYED RELEASE ORAL
Qty: 90 TABLET | Refills: 0 | Status: SHIPPED | OUTPATIENT
Start: 2019-11-22

## 2019-12-30 ENCOUNTER — TELEPHONE (OUTPATIENT)
Dept: RHEUMATOLOGY | Facility: CLINIC | Age: 44
End: 2019-12-30

## 2019-12-30 DIAGNOSIS — M19.90 INFLAMMATORY ARTHRITIS: Primary | ICD-10-CM

## 2020-01-08 RX ORDER — FOLIC ACID 1 MG/1
TABLET ORAL
Qty: 90 TABLET | Refills: 1 | Status: SHIPPED | OUTPATIENT
Start: 2020-01-08 | End: 2020-01-22 | Stop reason: SDUPTHER

## 2020-01-13 NOTE — TELEPHONE ENCOUNTER
We will discuss her POC on her F/U visit. Please make sure that she has a vectra DA drawn before her follow up appointment along with the regular/standard  lab test. Thanks!

## 2020-01-15 ENCOUNTER — LAB (OUTPATIENT)
Dept: LAB | Facility: HOSPITAL | Age: 45
End: 2020-01-15

## 2020-01-15 ENCOUNTER — LAB REQUISITION (OUTPATIENT)
Dept: LAB | Facility: HOSPITAL | Age: 45
End: 2020-01-15

## 2020-01-15 DIAGNOSIS — M06.09 RHEUMATOID ARTHRITIS WITHOUT RHEUMATOID FACTOR, MULTIPLE SITES (HCC): ICD-10-CM

## 2020-01-15 DIAGNOSIS — M19.90 INFLAMMATORY ARTHRITIS: ICD-10-CM

## 2020-01-15 DIAGNOSIS — Z79.899 LONG-TERM USE OF HIGH-RISK MEDICATION: ICD-10-CM

## 2020-01-15 LAB
ALBUMIN SERPL-MCNC: 4.2 G/DL (ref 3.5–5.2)
ALBUMIN/GLOB SERPL: 1.4 G/DL
ALP SERPL-CCNC: 74 U/L (ref 39–117)
ALT SERPL W P-5'-P-CCNC: 16 U/L (ref 1–33)
ANION GAP SERPL CALCULATED.3IONS-SCNC: 11 MMOL/L (ref 5–15)
AST SERPL-CCNC: 16 U/L (ref 1–32)
BILIRUB SERPL-MCNC: 0.2 MG/DL (ref 0.2–1.2)
BUN BLD-MCNC: 12 MG/DL (ref 6–20)
BUN/CREAT SERPL: 13.3 (ref 7–25)
CALCIUM SPEC-SCNC: 9.5 MG/DL (ref 8.6–10.5)
CHLORIDE SERPL-SCNC: 103 MMOL/L (ref 98–107)
CO2 SERPL-SCNC: 26 MMOL/L (ref 22–29)
CREAT BLD-MCNC: 0.9 MG/DL (ref 0.57–1)
CRP SERPL-MCNC: 0.14 MG/DL (ref 0–0.5)
DEPRECATED RDW RBC AUTO: 41.8 FL (ref 37–54)
EOSINOPHIL # BLD MANUAL: 0.17 10*3/MM3 (ref 0–0.4)
EOSINOPHIL NFR BLD MANUAL: 2 % (ref 0.3–6.2)
ERYTHROCYTE [DISTWIDTH] IN BLOOD BY AUTOMATED COUNT: 13.1 % (ref 12.3–15.4)
ERYTHROCYTE [SEDIMENTATION RATE] IN BLOOD: 2 MM/HR (ref 0–20)
GFR SERPL CREATININE-BSD FRML MDRD: 68 ML/MIN/1.73
GLOBULIN UR ELPH-MCNC: 3 GM/DL
GLUCOSE BLD-MCNC: 79 MG/DL (ref 65–99)
HCT VFR BLD AUTO: 44.4 % (ref 34–46.6)
HGB BLD-MCNC: 14.7 G/DL (ref 12–15.9)
LYMPHOCYTES # BLD MANUAL: 2.95 10*3/MM3 (ref 0.7–3.1)
LYMPHOCYTES NFR BLD MANUAL: 34 % (ref 19.6–45.3)
LYMPHOCYTES NFR BLD MANUAL: 6 % (ref 5–12)
MCH RBC QN AUTO: 29.1 PG (ref 26.6–33)
MCHC RBC AUTO-ENTMCNC: 33.1 G/DL (ref 31.5–35.7)
MCV RBC AUTO: 87.9 FL (ref 79–97)
MONOCYTES # BLD AUTO: 0.52 10*3/MM3 (ref 0.1–0.9)
NEUTROPHILS # BLD AUTO: 5.03 10*3/MM3 (ref 1.7–7)
NEUTROPHILS NFR BLD MANUAL: 58 % (ref 42.7–76)
PLAT MORPH BLD: NORMAL
PLATELET # BLD AUTO: 280 10*3/MM3 (ref 140–450)
PMV BLD AUTO: 10.3 FL (ref 6–12)
POTASSIUM BLD-SCNC: 4.3 MMOL/L (ref 3.5–5.2)
PROT SERPL-MCNC: 7.2 G/DL (ref 6–8.5)
RBC # BLD AUTO: 5.05 10*6/MM3 (ref 3.77–5.28)
RBC MORPH BLD: NORMAL
SODIUM BLD-SCNC: 140 MMOL/L (ref 136–145)
WBC MORPH BLD: NORMAL
WBC NRBC COR # BLD: 8.67 10*3/MM3 (ref 3.4–10.8)

## 2020-01-15 PROCEDURE — 80053 COMPREHEN METABOLIC PANEL: CPT

## 2020-01-15 PROCEDURE — 86140 C-REACTIVE PROTEIN: CPT

## 2020-01-15 PROCEDURE — 36415 COLL VENOUS BLD VENIPUNCTURE: CPT | Performed by: INTERNAL MEDICINE

## 2020-01-15 PROCEDURE — 85652 RBC SED RATE AUTOMATED: CPT

## 2020-01-15 PROCEDURE — 36415 COLL VENOUS BLD VENIPUNCTURE: CPT

## 2020-01-15 PROCEDURE — 85007 BL SMEAR W/DIFF WBC COUNT: CPT

## 2020-01-15 PROCEDURE — 85027 COMPLETE CBC AUTOMATED: CPT

## 2020-01-22 ENCOUNTER — OFFICE VISIT (OUTPATIENT)
Dept: RHEUMATOLOGY | Facility: CLINIC | Age: 45
End: 2020-01-22

## 2020-01-22 VITALS
SYSTOLIC BLOOD PRESSURE: 112 MMHG | HEART RATE: 83 BPM | HEIGHT: 65 IN | WEIGHT: 173 LBS | BODY MASS INDEX: 28.82 KG/M2 | DIASTOLIC BLOOD PRESSURE: 80 MMHG

## 2020-01-22 DIAGNOSIS — Z79.899 LONG-TERM USE OF HIGH-RISK MEDICATION: ICD-10-CM

## 2020-01-22 DIAGNOSIS — R22.40 SUBCUTANEOUS NODULE OF FOOT: ICD-10-CM

## 2020-01-22 DIAGNOSIS — M87.9 OSTEONECROSIS (HCC): ICD-10-CM

## 2020-01-22 DIAGNOSIS — M79.10 MYALGIA: ICD-10-CM

## 2020-01-22 DIAGNOSIS — M19.90 INFLAMMATORY ARTHRITIS: Primary | ICD-10-CM

## 2020-01-22 PROCEDURE — 99214 OFFICE O/P EST MOD 30 MIN: CPT | Performed by: INTERNAL MEDICINE

## 2020-01-22 RX ORDER — AMITRIPTYLINE HYDROCHLORIDE 50 MG/1
TABLET, FILM COATED ORAL
COMMUNITY
Start: 2020-01-13

## 2020-01-22 RX ORDER — DULOXETIN HYDROCHLORIDE 60 MG/1
CAPSULE, DELAYED RELEASE ORAL
COMMUNITY
Start: 2020-01-13

## 2020-01-22 RX ORDER — FOLIC ACID 1 MG/1
1000 TABLET ORAL DAILY
Qty: 90 TABLET | Refills: 3 | Status: SHIPPED | OUTPATIENT
Start: 2020-01-22

## 2020-01-22 NOTE — PROGRESS NOTES
HPI:  The patient is a 44-year-old female who comes today in follow-up for management of seronegative inflammatory arthritis.  She was seen in the office 9/24/2019.  At that time, methotrexate was stopped because of nausea.  She was later started on Enbrel which he has been taking regularly.  She claims that the pain in her hands has been getting better but she noticed myalgias, last week, the patient had difficulties walking because she was stiff, claims that this happened when the weather was raining, she has pain in the bottom of her feet and pain especially in her right knee.  She has been applying a compounded cream that worked initially only.Today her generalized pain is rated 5 out of 10, her morning stiffness is variable in duration.  She tries to rest but it does not help that much.    She was seen by her primary care doctor not too long ago and she started her on Cymbalta and amitriptyline to help her sleep.  She has been feeling a little bit better,  reports today as small knot in the left sole that is painful with pressure.    Laboratories reviewed for this visit show a normal CBC, normal CMP, ESR 0.14, ESR 2.  Vectra DA January 2020 29.  CK within normal limits, this laboratory was done at her primary care doctor's office recently as per the patient.    Review of systems is positive for weight gain.  Nausea and vomiting have improved after stopping methotrexate.  Denies fever chills, denies nausea vomiting or diarrhea, no shortness of breath or cough.  All other systems reviewed and they were negative.    Social and family history reviewed and unchanged.    Rheumatologic history:  1.Seronegative  inflammatory arthritis diagnosed 12/2018    Vectra DA 38     MTX (12/18) Start 12.5mg/wk for 2wks & then increase to 15mg/wk--SubQ due to GI distress     2. osteoporosis of the scaphoid lunate bilaterally  Status post surgery February 2019 by could send my nurse              Past Medical History:   Diagnosis  Date   • Arthritis    • Breast lump    • Migraine headache        Current Outpatient Medications   Medication Sig Dispense Refill   • amitriptyline (ELAVIL) 50 MG tablet TK 1 T PO  Q PM P AND SLEEP. MAY INCREASE TO 2 TS IF NEEDED     • cyanocobalamin 1000 MCG/ML injection INJECT 1 ML IM EVERY 2 WEEK  8   • diclofenac (VOLTAREN) 1 % gel gel DICLOFENAC SODIUM 1 % GEL     • DULoxetine (CYMBALTA) 60 MG capsule TK 1 C PO QD     • Etanercept (ENBREL MINI) 50 MG/ML solution cartridge Inject 50 mg under the skin into the appropriate area as directed 1 (One) Time Per Week. 12 Cartridge 0   • folic acid (FOLVITE) 1 MG tablet TAKE 1 TABLET BY MOUTH DAILY 90 tablet 1   • guaifenesin-dextromethorphan (MUCINEX DM)  MG tablet sustained-release 12 hour tablet MUCINEX DM  MG XR12H-TAB     • vitamin D (ERGOCALCIFEROL) 33086 units capsule capsule TK 1 C PO ONCE A WEEK AS DIRECTED  8   • Methotrexate Sodium (METHOTREXATE PF) 50 MG/2ML chemo syringe INJECT 0.6 ML UNDER THE SKIN ONCE A WEEK. DISCARD VIAL AFTER USE 10 mL 0   • pantoprazole (PROTONIX) 20 MG EC tablet TAKE 1 TABLET BY MOUTH DAILY. 90 tablet 0   • sulfaSALAzine (AZULFIDINE) 500 MG tablet Every 12 (Twelve) Hours.       No current facility-administered medications for this visit.        Physical exam:    Vitals:    01/22/20 0916   BP: 112/80   Pulse: 83        GENERAL: Well-developed, well-nourished in no acute distress. Alert and oriented x3.  HEENT: Normocephalic, atraumatic. Pupils are equal, round, and reactive to light. Extraocular muscles are intact. Mucous membranes are pink and moist. Nostrils are clear.   NECK: Supple without lymphadenopathy.  LUNGS: Clear to auscultation bilaterally.  HEART: Regular rate and rhythm without murmur, rub or gallop.  CHEST: Respirations easy and unlabored.  EXTREMITIES: No cyanosis, edema or clubbing.  SKIN: Warm, dry and intact.  MSK: Mild tenderness in the lower back in the paraspinal muscles elicited with pressure.  Mild  tenderness with flexion and extension of the left knee.  Crepitus noted.  Ballottement and bulge sign above negative.  No major joint tenderness, no signs of synovitis.  There is 1 cm mildly tender nodule in the left sole.    Assessment:  1.  Seronegative inflammatory arthritis, managing well with current therapy of Enbrel.  Labs reviewed, Vectra DA 29, decreased.  Patient in remission.  Plan to continue with Enbrel with no changes.    2.  Long-term high-risk medications, the patient is on medications for management of inflammatory arthritis.  Monitoring for side effects.  Recommend to keep up-to-date with her age-appropriate cancer screening and vaccinations.      Cancer screening:  Colonoscopy:    YES  2017   Mammogram: 2019        Pap Smear: Hysterectomy  Bone health: Calcium and Vitamin D:    YES           DXA scan: NO  Vaccines: Flu: no     PNA: 13 1/2019 23 2/2019   Zoster:  NO  X-rays: Chest:  12/5/18           Hands:  11/9/18 and [facility] U/S   K&k 2019 xray              Feet: 12/5/18  Hepatitis panel, HIV, QTB/PPD:   11/6/18 HEP, HIV and TB    3.  Myalgias.  She had laboratory work-up ordered by her primary care doctor, her CK was normal.  Labs reviewed, normal acute phase reactants.  Physical exam is benign with strength muscle mass and tone preserved as well as reflexes.  Differential includes fibromyalgia and other conditions that may produce fibromyalgia-like symptoms.  She is known to have poor nonrestorative sleep, she had recently been started on duloxetine and amitriptyline.  Recommend to continue those medications.    4.  Subcutaneous nodule in the left foot.  New to me.  Proceed with ultrasound of the left foot.    5.  Nausea.  Improved.    Plan:  Continue with Enbrel 50 mg subcutaneously once a week  US of the left foot  RTC 3 months or sooner if needed      Orders Placed This Encounter   Procedures   • US Soft Tissue     [facility] Ultrasound of the left foot (sole) Small subcutaneous nodule in the left  foot, tender to pressure.     Standing Status:   Future     Standing Expiration Date:   1/22/2021     Scheduling Instructions:      [facility] Ultrasound of the left foot (sole) Small subcutaneous nodule in the left foot, tender to pressure.     Order Specific Question:   Reason for Exam:     Answer:   Chronic polyarthralgias   • Comprehensive Metabolic Panel     Standing Status:   Future     Standing Expiration Date:   1/22/2021   • C-reactive Protein     Standing Status:   Future     Standing Expiration Date:   1/22/2021   • Sedimentation Rate     Standing Status:   Future     Standing Expiration Date:   1/22/2021   • Aldolase   • CBC With Manual Differential     Standing Status:   Future

## 2020-01-27 DIAGNOSIS — R22.40 SUBCUTANEOUS NODULE OF FOOT: Primary | ICD-10-CM

## 2020-02-10 ENCOUNTER — TELEPHONE (OUTPATIENT)
Dept: RHEUMATOLOGY | Facility: CLINIC | Age: 45
End: 2020-02-10

## 2020-02-10 DIAGNOSIS — M72.9 FIBROMATOSIS: Primary | ICD-10-CM

## 2020-02-10 NOTE — TELEPHONE ENCOUNTER
Charu informed of results and voiced an understanding. She is going to call back with the name of the foot doctor that she wants to go to.  She also wants to know if you would refer her to [facility] for the pain she's been having in her back. She does know that you are out of the office at the time

## 2020-02-10 NOTE — TELEPHONE ENCOUNTER
----- Message from Aimee Ramirez MD sent at 2/6/2020  8:01 AM EST -----  These let her know that the nodule that she has does not seem to be something malignant, it is called fibromatosis. There are different modalities of treatment that I believe will be addressed by a foot Ortho doctor.  They could include insoles, stretches or even surgery if needed.  I would like to refer her to foot Ortho , if she is agreeable, please place referral in the system.

## 2020-02-20 NOTE — TELEPHONE ENCOUNTER
If her pain is that severe, this needs to be evaluated first before sending her to pain management, please recommend to her to be seen by her PCP.  I will back in the office next week.

## 2020-03-20 DIAGNOSIS — M19.90 INFLAMMATORY ARTHRITIS: ICD-10-CM

## 2020-03-20 DIAGNOSIS — Z79.899 LONG-TERM USE OF HIGH-RISK MEDICATION: ICD-10-CM

## 2020-03-20 RX ORDER — FOLIC ACID 1 MG/1
1000 TABLET ORAL DAILY
Qty: 90 TABLET | Refills: 3 | OUTPATIENT
Start: 2020-03-20

## 2020-04-22 ENCOUNTER — OFFICE VISIT (OUTPATIENT)
Dept: RHEUMATOLOGY | Facility: CLINIC | Age: 45
End: 2020-04-22

## 2020-04-22 VITALS — WEIGHT: 165 LBS | BODY MASS INDEX: 27.46 KG/M2

## 2020-04-22 DIAGNOSIS — M72.2 FIBROMATOSIS, PLANTAR: ICD-10-CM

## 2020-04-22 DIAGNOSIS — Z79.899 LONG-TERM USE OF HIGH-RISK MEDICATION: Primary | ICD-10-CM

## 2020-04-22 DIAGNOSIS — M19.90 INFLAMMATORY ARTHRITIS: ICD-10-CM

## 2020-04-22 DIAGNOSIS — M54.50 MIDLINE LOW BACK PAIN, UNSPECIFIED CHRONICITY, UNSPECIFIED WHETHER SCIATICA PRESENT: ICD-10-CM

## 2020-04-22 PROCEDURE — 99213 OFFICE O/P EST LOW 20 MIN: CPT | Performed by: INTERNAL MEDICINE

## 2020-04-22 NOTE — PROGRESS NOTES
You have chosen to receive care through a telephone visit. Do you consent to use a telephone visit for your medical care today? Yes    HPI:  The patient is a 44-year-old female with seronegative inflammatory arthritis.  She takes Enbrel 50 mg subcutaneously once a week.  She is compliant, denies side effects.  2-3 times, she had problems with the self injector, she claims that it seemed like the plunger were only helped way.  She has already contacted the Enbrel support.    On the last visit in January 2020, the patient was doing very well clinically, she had a Vectra DA score of 29.    She was complaining of a nodule in her left foot on the last visit, she had an ultrasound that was compatible with plantar fibromatosis.  She was sent to the foot orthopedic doctor but she has not been able to be evaluated yet because of the coronavirus pandemia.    In February she contacted the office because of back pain.  She was hoping to get a referral for pain management, today she tells me that several years ago she had an injury in her job and developed sciatic nerve impingement.  She had been seen recently by her PCP due to this problem and was prescribed duloxetine and amitriptyline and currently she is doing much better, she has only occasional pain in her lower back and her left lower extremity.    Most recently, she has noticed pain in her hands, she has been [facility] and she admits that she is more active.  The pain is achy, she has stiffness that lasts about an hour.  She feels that her hands are swollen.    No recent laboratories for review today.      Past Medical History:   Diagnosis Date   • Arthritis    • Breast lump    • Migraine headache        Current Outpatient Medications   Medication Sig Dispense Refill   • amitriptyline (ELAVIL) 50 MG tablet TK 1 T PO  Q PM P AND SLEEP. MAY INCREASE TO 2 TS IF NEEDED     • cyanocobalamin 1000 MCG/ML injection INJECT 1 ML IM EVERY 2 WEEK  8   • diclofenac  (VOLTAREN) 1 % gel gel DICLOFENAC SODIUM 1 % GEL     • DULoxetine (CYMBALTA) 60 MG capsule TK 1 C PO QD     • Etanercept (ENBREL MINI) 50 MG/ML solution cartridge Inject 50 mg under the skin into the appropriate area as directed 1 (One) Time Per Week. 12 Cartridge 0   • folic acid (FOLVITE) 1 MG tablet Take 1 tablet by mouth Daily. 90 tablet 3   • guaifenesin-dextromethorphan (MUCINEX DM)  MG tablet sustained-release 12 hour tablet MUCINEX DM  MG XR12H-TAB     • Methotrexate Sodium (METHOTREXATE PF) 50 MG/2ML chemo syringe INJECT 0.6 ML UNDER THE SKIN ONCE A WEEK. DISCARD VIAL AFTER USE 10 mL 0   • pantoprazole (PROTONIX) 20 MG EC tablet TAKE 1 TABLET BY MOUTH DAILY. 90 tablet 0   • sulfaSALAzine (AZULFIDINE) 500 MG tablet Every 12 (Twelve) Hours.     • vitamin D (ERGOCALCIFEROL) 10137 units capsule capsule TK 1 C PO ONCE A WEEK AS DIRECTED  8     No current facility-administered medications for this visit.        Physical exam:    There were no vitals filed for this visit.     Assessment:  #1 seronegative inflammatory arthritis.  Deteriorated.  Is more symptomatic, reports a swelling and prolonged stiffness.  We will do laboratories including a Vectra DA.  For now we will continue with Enbrel with no changes.    2.  Long-term high-risk medications, the patient is on medications for management of inflammatory arthritis.  I am monitoring for side effects.  Recommend to keep up-to-date with her age-appropriate cancer screening and vaccinations.         Cancer screening:  Colonoscopy:    YES  2017   Mammogram: 2019        Pap Smear: Hysterectomy  Bone health: Calcium and Vitamin D:    YES           DXA scan: NO  Vaccines: Flu: no     PNA: 13 1/2019 23 2/2019   Zoster:  NO  X-rays: Chest:  12/5/18      Hands:  11/9/18 and [facility] U/S    Feet xray and US: 1/29/20  Hepatitis panel, HIV, QTB/PPD:   11/6/18 NEGATIVE    3.  Plantar fibromatosis.  She was sent to foot orthopedic doctor.    4.  Low back pain.  She has  been evaluated by her PCP.  Currently she is doing better.    Plan:  Continue with Enbrel 50 mg subcutaneously once a week  Laboratories to be done this week if possible, will update the TB test  Will update x-rays of the hands and feet on the next visit due to the coronavirus pandemia.  RTC 3 months or sooner if needed      20 minutes were spent in this encounter.  Orders Placed This Encounter   Procedures   • Comprehensive Metabolic Panel     Standing Status:   Future     Standing Expiration Date:   4/22/2021   • C-reactive Protein     Standing Status:   Future     Standing Expiration Date:   4/22/2021   • Sedimentation Rate     Standing Status:   Future     Standing Expiration Date:   4/22/2021   • TSPOT     Standing Status:   Future     Standing Expiration Date:   4/22/2021   • CBC With Manual Differential     Standing Status:   Future     Standing Expiration Date:   4/22/2021

## 2020-04-28 ENCOUNTER — LAB (OUTPATIENT)
Dept: LAB | Facility: HOSPITAL | Age: 45
End: 2020-04-28

## 2020-04-28 DIAGNOSIS — Z79.899 LONG-TERM USE OF HIGH-RISK MEDICATION: ICD-10-CM

## 2020-04-28 DIAGNOSIS — M87.9 OSTEONECROSIS (HCC): ICD-10-CM

## 2020-04-28 DIAGNOSIS — M19.90 INFLAMMATORY ARTHRITIS: ICD-10-CM

## 2020-04-28 DIAGNOSIS — M79.10 MYALGIA: ICD-10-CM

## 2020-04-28 DIAGNOSIS — M72.2 FIBROMATOSIS, PLANTAR: ICD-10-CM

## 2020-04-28 DIAGNOSIS — R22.40 SUBCUTANEOUS NODULE OF FOOT: ICD-10-CM

## 2020-04-28 DIAGNOSIS — M54.50 MIDLINE LOW BACK PAIN, UNSPECIFIED CHRONICITY, UNSPECIFIED WHETHER SCIATICA PRESENT: ICD-10-CM

## 2020-04-28 LAB
ALBUMIN SERPL-MCNC: 4.4 G/DL (ref 3.5–5.2)
ALBUMIN/GLOB SERPL: 1.3 G/DL
ALP SERPL-CCNC: 87 U/L (ref 39–117)
ALT SERPL W P-5'-P-CCNC: 13 U/L (ref 1–33)
ANION GAP SERPL CALCULATED.3IONS-SCNC: 14.3 MMOL/L (ref 5–15)
AST SERPL-CCNC: 17 U/L (ref 1–32)
BASOPHILS # BLD MANUAL: 0.09 10*3/MM3 (ref 0–0.2)
BASOPHILS NFR BLD AUTO: 1 % (ref 0–1.5)
BILIRUB SERPL-MCNC: 0.2 MG/DL (ref 0.2–1.2)
BUN BLD-MCNC: 9 MG/DL (ref 6–20)
BUN/CREAT SERPL: 9.5 (ref 7–25)
CALCIUM SPEC-SCNC: 9.6 MG/DL (ref 8.6–10.5)
CHLORIDE SERPL-SCNC: 101 MMOL/L (ref 98–107)
CO2 SERPL-SCNC: 25.7 MMOL/L (ref 22–29)
CREAT BLD-MCNC: 0.95 MG/DL (ref 0.57–1)
CRP SERPL-MCNC: 0.65 MG/DL (ref 0–0.5)
DEPRECATED RDW RBC AUTO: 41.7 FL (ref 37–54)
EOSINOPHIL # BLD MANUAL: 0.09 10*3/MM3 (ref 0–0.4)
EOSINOPHIL NFR BLD MANUAL: 1 % (ref 0.3–6.2)
ERYTHROCYTE [DISTWIDTH] IN BLOOD BY AUTOMATED COUNT: 13.2 % (ref 12.3–15.4)
ERYTHROCYTE [SEDIMENTATION RATE] IN BLOOD: 6 MM/HR (ref 0–20)
GFR SERPL CREATININE-BSD FRML MDRD: 64 ML/MIN/1.73
GLOBULIN UR ELPH-MCNC: 3.3 GM/DL
GLUCOSE BLD-MCNC: 93 MG/DL (ref 65–99)
HCT VFR BLD AUTO: 44.8 % (ref 34–46.6)
HGB BLD-MCNC: 14.8 G/DL (ref 12–15.9)
LYMPHOCYTES # BLD MANUAL: 2.48 10*3/MM3 (ref 0.7–3.1)
LYMPHOCYTES NFR BLD MANUAL: 10.2 % (ref 5–12)
LYMPHOCYTES NFR BLD MANUAL: 28.6 % (ref 19.6–45.3)
MCH RBC QN AUTO: 28.5 PG (ref 26.6–33)
MCHC RBC AUTO-ENTMCNC: 33 G/DL (ref 31.5–35.7)
MCV RBC AUTO: 86.3 FL (ref 79–97)
MONOCYTES # BLD AUTO: 0.88 10*3/MM3 (ref 0.1–0.9)
NEUTROPHILS # BLD AUTO: 5.13 10*3/MM3 (ref 1.7–7)
NEUTROPHILS NFR BLD MANUAL: 59.2 % (ref 42.7–76)
PLAT MORPH BLD: NORMAL
PLATELET # BLD AUTO: 309 10*3/MM3 (ref 140–450)
PMV BLD AUTO: 10.2 FL (ref 6–12)
POIKILOCYTOSIS BLD QL SMEAR: NORMAL
POTASSIUM BLD-SCNC: 4.2 MMOL/L (ref 3.5–5.2)
PROT SERPL-MCNC: 7.7 G/DL (ref 6–8.5)
RBC # BLD AUTO: 5.19 10*6/MM3 (ref 3.77–5.28)
SODIUM BLD-SCNC: 141 MMOL/L (ref 136–145)
WBC MORPH BLD: NORMAL
WBC NRBC COR # BLD: 8.66 10*3/MM3 (ref 3.4–10.8)

## 2020-04-28 PROCEDURE — 36415 COLL VENOUS BLD VENIPUNCTURE: CPT

## 2020-04-28 PROCEDURE — 80053 COMPREHEN METABOLIC PANEL: CPT

## 2020-04-28 PROCEDURE — 85652 RBC SED RATE AUTOMATED: CPT

## 2020-04-28 PROCEDURE — 86481 TB AG RESPONSE T-CELL SUSP: CPT

## 2020-04-28 PROCEDURE — 85007 BL SMEAR W/DIFF WBC COUNT: CPT

## 2020-04-28 PROCEDURE — 86140 C-REACTIVE PROTEIN: CPT

## 2020-04-28 PROCEDURE — 85027 COMPLETE CBC AUTOMATED: CPT

## 2020-04-30 LAB
TSPOT INTERPRETATION: NEGATIVE
TSPOT NIL CONTROL INTERPRETATION: NORMAL
TSPOT PANEL A: 0
TSPOT PANEL B: 0
TSPOT POS CONTROL INTERPRETATION: NORMAL

## 2020-05-08 ENCOUNTER — TELEPHONE (OUTPATIENT)
Dept: RHEUMATOLOGY | Facility: CLINIC | Age: 45
End: 2020-05-08

## 2020-05-08 NOTE — TELEPHONE ENCOUNTER
----- Message from Aimee Ramirez MD sent at 5/6/2020  2:59 PM EDT -----  Laboratories were reviewed, they are for the most part unremarkable.  There is a slight elevation in 1 of the markers of inflammation but is not significant.

## 2020-05-21 ENCOUNTER — OFFICE VISIT (OUTPATIENT)
Dept: PODIATRY | Facility: CLINIC | Age: 45
End: 2020-05-21

## 2020-05-21 VITALS
DIASTOLIC BLOOD PRESSURE: 82 MMHG | TEMPERATURE: 97.3 F | HEART RATE: 103 BPM | SYSTOLIC BLOOD PRESSURE: 122 MMHG | HEIGHT: 65 IN | WEIGHT: 176.6 LBS | BODY MASS INDEX: 29.42 KG/M2

## 2020-05-21 DIAGNOSIS — M21.6X1 EQUINUS DEFORMITY OF BOTH FEET: ICD-10-CM

## 2020-05-21 DIAGNOSIS — M79.672 BILATERAL FOOT PAIN: Primary | ICD-10-CM

## 2020-05-21 DIAGNOSIS — M77.41 METATARSALGIA OF BOTH FEET: ICD-10-CM

## 2020-05-21 DIAGNOSIS — M79.671 BILATERAL FOOT PAIN: Primary | ICD-10-CM

## 2020-05-21 DIAGNOSIS — M21.6X2 EQUINUS DEFORMITY OF BOTH FEET: ICD-10-CM

## 2020-05-21 DIAGNOSIS — M77.42 METATARSALGIA OF BOTH FEET: ICD-10-CM

## 2020-05-21 PROCEDURE — 99203 OFFICE O/P NEW LOW 30 MIN: CPT | Performed by: PODIATRIST

## 2020-05-21 RX ORDER — ONDANSETRON 4 MG/1
TABLET, FILM COATED ORAL
COMMUNITY
Start: 2020-05-07

## 2020-05-22 NOTE — PROGRESS NOTES
05/21/2020  Foot and Ankle Surgery - New Patient   Provider: Dr. Jameel Cole DPM  Location: [facility]    Subjective:  Charu Chávez is a 45 y.o. female.     Chief Complaint   Patient presents with   • Right Foot - Pain   • Left Foot - Pain       HPI: Patient is a 45-year-old female with history of rheumatoid arthritis that presents with bilateral foot pain.  Patient complains of longstanding discomfort which has been recently progressive over the last few months.  She is unaware of any previous injuries.  She localizes the majority of the pain to the plantar aspect of her feet when noticed.  She also has intermittent numbness and tingling sensations to her toes.  Symptoms do tend to improve with rest.  She rates her symptoms a 8 out of 10 when noticed.  She is concerned that these issues will progress and cause further limitation.  She also complains of significant tightness involving her lower extremities.    Allergies   Allergen Reactions   • Ciprofloxacin Hives       Past Medical History:   Diagnosis Date   • Arthritis    • Breast lump    • Migraine headache        Past Surgical History:   Procedure Laterality Date   • CHOLECYSTECTOMY     • COLONOSCOPY     • HYSTERECTOMY     • STOMACH SURGERY      exploratory   • WRIST SURGERY Right        Family History   Problem Relation Age of Onset   • Diabetes Maternal Grandmother    • Kidney disease Maternal Grandmother    • Stroke Maternal Grandmother    • Hypertension Maternal Grandfather        Social History     Socioeconomic History   • Marital status:      Spouse name: Not on file   • Number of children: 1   • Years of education: Not on file   • Highest education level: Not on file   Tobacco Use   • Smoking status: Current Every Day Smoker     Packs/day: 0.50     Types: Cigarettes   • Smokeless tobacco: Never Used   Substance and Sexual Activity   • Alcohol use: Yes     Comment: 2-3 times a year   • Drug use: Defer   • Sexual activity:  Defer        Current Outpatient Medications on File Prior to Visit   Medication Sig Dispense Refill   • amitriptyline (ELAVIL) 50 MG tablet TK 1 T PO  Q PM P AND SLEEP. MAY INCREASE TO 2 TS IF NEEDED     • CHANTIX STARTING MONTH LACIE 0.5 MG X 11 & 1 MG X 42 tablet UTD     • cyanocobalamin 1000 MCG/ML injection INJECT 1 ML IM EVERY 2 WEEK  8   • diclofenac (VOLTAREN) 1 % gel gel DICLOFENAC SODIUM 1 % GEL     • DULoxetine (CYMBALTA) 60 MG capsule TK 1 C PO QD     • Etanercept (Enbrel Mini) 50 MG/ML solution cartridge Inject 50 mg under the skin into the appropriate area as directed 1 (One) Time Per Week. 12 Cartridge 0   • folic acid (FOLVITE) 1 MG tablet Take 1 tablet by mouth Daily. 90 tablet 3   • guaifenesin-dextromethorphan (MUCINEX DM)  MG tablet sustained-release 12 hour tablet MUCINEX DM  MG XR12H-TAB     • Methotrexate Sodium (METHOTREXATE PF) 50 MG/2ML chemo syringe INJECT 0.6 ML UNDER THE SKIN ONCE A WEEK. DISCARD VIAL AFTER USE 10 mL 0   • ondansetron (ZOFRAN) 4 MG tablet TK 1 TO 2 TS PO BID PRF NAUSEA     • pantoprazole (PROTONIX) 20 MG EC tablet TAKE 1 TABLET BY MOUTH DAILY. 90 tablet 0   • sulfaSALAzine (AZULFIDINE) 500 MG tablet Every 12 (Twelve) Hours.     • vitamin D (ERGOCALCIFEROL) 24786 units capsule capsule TK 1 C PO ONCE A WEEK AS DIRECTED  8     No current facility-administered medications on file prior to visit.        Review of Systems:  General: Denies fever, chills, fatigue, and weakness.  Eyes: Denies vision loss, blurry vision, and excessive redness.  ENT: Denies hearing issues and difficulty swallowing.  Cardiovascular: Denies palpitations, chest pain, or syncopal episodes.  Respiratory: Denies shortness of breath, wheezing, and coughing.  GI: Denies abdominal pain, nausea, and vomiting.   : Denies frequency, hematuria, and urgency.  Musculoskeletal: + Bilateral lower extremity tightness and foot pain  Derm: Denies rash, open wounds, or suspicious lesions.  Neuro: Denies  "headaches, numbness, loss of coordination, and tremors.  Psych: Denies anxiety and depression.  Endocrine: Denies temperature intolerance and changes in appetite.  Heme: Denies bleeding disorders or abnormal bruising.     Objective   /82   Pulse 103   Temp 97.3 °F (36.3 °C) (Oral)   Ht 165.1 cm (65\")   Wt 80.1 kg (176 lb 9.6 oz)   BMI 29.39 kg/m²     Foot/Ankle Exam:       General:   Appearance: appears stated age and healthy    Orientation: AAOx3    Affect: appropriate    Gait: antalgic      VASCULAR      Right Foot Vascularity   Normal vascular exam    Dorsalis pedis:  2+  Posterior tibial:  2+  Skin Temperature: warm    Edema Grading:  None  CFT:  < 3 seconds  Pedal Hair Growth:  Present  Varicosities: none       Left Foot Vascularity   Normal vascular exam    Dorsalis pedis:  2+  Posterior tibial:  2+  Skin Temperature: warm    Edema Grading:  None  CFT:  < 3 seconds  Pedal Hair Growth:  Present  Varicosities: none        NEUROLOGIC     Right Foot Neurologic   Light touch sensation:  Normal  Hot/Cold sensation: normal    Achilles reflex:  2+     Left Foot Neurologic   Light touch sensation:  Normal  Hot/cold sensation: normal    Achilles reflex:  2+     MUSCULOSKELETAL      Right Foot Musculoskeletal   Ecchymosis:  None  Tenderness: lesser metatarsophalangeal joints, plantar fascia, plantar heel, arch and dorsal foot    Arch:  Normal     Left Foot Musculoskeletal   Ecchymosis:  None  Tenderness: lesser metatarsophalangeal joints, plantar fascia, plantar heel, arch and dorsal foot    Arch:  Normal     MUSCLE STRENGTH     Right Foot Muscle Strength   Normal strength    Foot dorsiflexion:  5  Foot plantar flexion:  5  Foot inversion:  5  Foot eversion:  5     Left Foot Muscle Strength   Normal strength    Foot dorsiflexion:  5  Foot plantar flexion:  5  Foot inversion:  5  Foot eversion:  5     RANGE OF MOTION      Right Foot Range of Motion   Foot and ankle ROM within normal limits       Left Foot Range " of Motion   Foot and ankle ROM within normal limits       DERMATOLOGIC     Right Foot Dermatologic   Skin: skin intact       Left Foot Dermatologic   Skin: skin intact       TESTS     Right Foot Tests   Anterior drawer: negative    Varus tilt: negative    Syndesmosis squeeze: negative    Heel raise: normal control       Left Foot Tests   Anterior drawer: negative    Varus tilt: negative    Syndesmosis squeeze: negative    Heel raise: normal control        Right Foot Additional Comments Diffuse discomfort with light palpation to the feet.  Equinus contracture with knee extended and flexed with fairly significant soft tissue rigidity to the feet.  No gross deformity or instability.      Assessment/Plan   Charu was seen today for pain and pain.    Diagnoses and all orders for this visit:    Bilateral foot pain  -     Ambulatory Referral to Physical Therapy Evaluate and treat; Stretching, ROM, Strengthening    Metatarsalgia of both feet    Equinus deformity of both feet      Patient presents with longstanding issues involving both feet.  She complains of prominent discomfort with weightbearing activities that tends to improve with rest.  Prior imaging was reviewed showing no obvious fractures, dislocations, or cuco degenerative changes.  On exam, she does have excessive equinus contracture and tightness involving the lower extremities.  I do feel that her symptoms are secondary to lack of support and overall soft tissue rigidity.  I have asked that she acquire a pair of over-the-counter arch supports.  We did discuss proper use and effects.  I would like her to start at home stretching and manual therapy exercises, but I do feel that she would benefit from formal physical therapy.  She is to refrain from barefoot and unsupported weightbearing.  We did discuss rice therapy.  I would like to see her in 4 to 6 weeks for reevaluation.    Orders Placed This Encounter   Procedures   • Ambulatory Referral to Physical  Therapy Evaluate and treat; Stretching, ROM, Strengthening     Referral Priority:   Routine     Referral Type:   Therapy     Referral Reason:   Specialty Services Required     Requested Specialty:   Physical Therapy     Number of Visits Requested:   1        Note is dictated utilizing voice recognition software. Unfortunately this leads to occasional typographical errors. I apologize in advance if the situation occurs. If questions occur please do not hesitate to call our office.

## 2021-01-08 ENCOUNTER — TELEPHONE (OUTPATIENT)
Dept: PODIATRY | Facility: CLINIC | Age: 46
End: 2021-01-08

## 2021-01-08 NOTE — TELEPHONE ENCOUNTER
PATIENT DISCUSSED INSOLES FOR BILATERAL FOOT PAIN WITH DR. ELIZABETH ON LAST APPT 5-, SHE STATED THAT HER INSURANCE NEEDS TO CONFIRM THE CPT CODE TO COVER THE PRESCRIPTION.  SHE WOULD LIKE A CALL BACK TO DISCUSS.    WILLIAM MARTINEZ MAY BE REACHED AT:  [phone]

## 2021-01-15 ENCOUNTER — TELEPHONE (OUTPATIENT)
Dept: ORTHOPEDIC SURGERY | Facility: CLINIC | Age: 46
End: 2021-01-15

## 2021-01-15 NOTE — TELEPHONE ENCOUNTER
Provider:     Caller: PATIENT    Relationship to Patient: SELF      Phone Number: [phone]    Reason for Call: PT. STATES THAT SHE SAW DR. ELIZABETH IN MAY 2020 AND WAS TOLD THAT SHE NEEDED POWERSTEP INSOLES.   PT. STATES THAT HER INSURANCE WILL NOT COVER GETTING THEM THROUGH  OFFICE OR [facility] [place].   THEY WILL PAY FOR HER TO GET THEM THROUGH [facility].    SHE IS NOT SURE WHAT SPECIFIC TYPE OF POWERSTEP INSOLES THAT SHE IS SUPPOSED TO GET.   ASKING IF DR. ELIZABETH WILL CALL TO ADVISE.